# Patient Record
Sex: MALE | Race: OTHER | Employment: FULL TIME | URBAN - METROPOLITAN AREA
[De-identification: names, ages, dates, MRNs, and addresses within clinical notes are randomized per-mention and may not be internally consistent; named-entity substitution may affect disease eponyms.]

---

## 2022-03-09 ENCOUNTER — OFFICE VISIT (OUTPATIENT)
Dept: INTERNAL MEDICINE CLINIC | Age: 42
End: 2022-03-09
Payer: COMMERCIAL

## 2022-03-09 VITALS
DIASTOLIC BLOOD PRESSURE: 80 MMHG | OXYGEN SATURATION: 98 % | TEMPERATURE: 96 F | BODY MASS INDEX: 36.26 KG/M2 | HEART RATE: 56 BPM | RESPIRATION RATE: 16 BRPM | HEIGHT: 67 IN | SYSTOLIC BLOOD PRESSURE: 122 MMHG | WEIGHT: 231 LBS

## 2022-03-09 DIAGNOSIS — Z13.6 ENCOUNTER FOR LIPID SCREENING FOR CARDIOVASCULAR DISEASE: ICD-10-CM

## 2022-03-09 DIAGNOSIS — E66.9 CLASS 2 OBESITY WITHOUT SERIOUS COMORBIDITY WITH BODY MASS INDEX (BMI) OF 36.0 TO 36.9 IN ADULT, UNSPECIFIED OBESITY TYPE: Primary | ICD-10-CM

## 2022-03-09 DIAGNOSIS — Z76.89 ENCOUNTER TO ESTABLISH CARE WITH NEW DOCTOR: ICD-10-CM

## 2022-03-09 DIAGNOSIS — B35.4 TINEA CORPORIS: ICD-10-CM

## 2022-03-09 DIAGNOSIS — Z13.220 ENCOUNTER FOR LIPID SCREENING FOR CARDIOVASCULAR DISEASE: ICD-10-CM

## 2022-03-09 DIAGNOSIS — Z13.1 ENCOUNTER FOR SCREENING FOR DIABETES MELLITUS: ICD-10-CM

## 2022-03-09 LAB
ANION GAP SERPL CALC-SCNC: 4 MMOL/L (ref 5–15)
BUN SERPL-MCNC: 12 MG/DL (ref 6–20)
BUN/CREAT SERPL: 15 (ref 12–20)
CALCIUM SERPL-MCNC: 9.3 MG/DL (ref 8.5–10.1)
CHLORIDE SERPL-SCNC: 104 MMOL/L (ref 97–108)
CHOLEST SERPL-MCNC: 188 MG/DL
CO2 SERPL-SCNC: 27 MMOL/L (ref 21–32)
CREAT SERPL-MCNC: 0.82 MG/DL (ref 0.7–1.3)
EST. AVERAGE GLUCOSE BLD GHB EST-MCNC: 100 MG/DL
GLUCOSE SERPL-MCNC: 85 MG/DL (ref 65–100)
HBA1C MFR BLD: 5.1 % (ref 4–5.6)
HDLC SERPL-MCNC: 52 MG/DL
HDLC SERPL: 3.6 {RATIO} (ref 0–5)
HIV 1+2 AB+HIV1 P24 AG SERPL QL IA: NONREACTIVE
HIV12 RESULT COMMENT, HHIVC: NORMAL
LDLC SERPL CALC-MCNC: 110.6 MG/DL (ref 0–100)
POTASSIUM SERPL-SCNC: 4.8 MMOL/L (ref 3.5–5.1)
SODIUM SERPL-SCNC: 135 MMOL/L (ref 136–145)
TRIGL SERPL-MCNC: 127 MG/DL (ref ?–150)
VLDLC SERPL CALC-MCNC: 25.4 MG/DL

## 2022-03-09 PROCEDURE — 99203 OFFICE O/P NEW LOW 30 MIN: CPT | Performed by: INTERNAL MEDICINE

## 2022-03-09 RX ORDER — DOXYLAMINE SUCCINATE 25 MG
TABLET ORAL 2 TIMES DAILY
Qty: 15 G | Refills: 0 | Status: SHIPPED | OUTPATIENT
Start: 2022-03-09

## 2022-03-09 NOTE — PROGRESS NOTES
Office Visit Note:    Assessment/Plan:  1. Class 2 obesity without serious comorbidity with body mass index (BMI) of 36.0 to 36.9 in adult, unspecified obesity type    2. Encounter to establish care with new doctor    3. Encounter for screening for diabetes mellitus    4. Encounter for lipid screening for cardiovascular disease    5. Tinea corporis      1. Obesity -advised low-carb diet, regular exercise  2. Tinea corporis-we will try topical miconazole, if it does not improve I will refer him to dermatology. Health Maintenance:  Immunizations:  Tetanus: due 2028  Covid: 3 pfizer    Screening:  Hepatitis C: Ordered today  HIV: Ordered today  Diabetes: A1c ordered today  Depression: PHQ-2 negative  Exercise: fair  Diet: fair  Sleep: ok, takes melatonon  Blood pressure good today, advised to check blood pressure at home  Follow-up in 3 months    Orders Placed This Encounter    LIPID PANEL     Standing Status:   Future     Standing Expiration Date:   3/9/2023    HIV 1/2 AG/AB, 4TH GENERATION,W RFLX CONFIRM     Standing Status:   Future     Standing Expiration Date:   3/9/2023    HEMOGLOBIN A1C WITH EAG     Standing Status:   Future     Standing Expiration Date:   3/9/2023    HCV AB W/RFLX TO HANSEL     Standing Status:   Future     Standing Expiration Date:   5/0/9285    METABOLIC PANEL, BASIC     Standing Status:   Future     Standing Expiration Date:   3/9/2023    miconazole (MICOTIN) 2 % topical cream     Sig: Apply  to affected area two (2) times a day.      Dispense:  15 g     Refill:  0     Social Determinants of Health     Tobacco Use: Medium Risk    Smoking Tobacco Use: Former Smoker    Smokeless Tobacco Use: Never Used   Alcohol Use:     Frequency of Alcohol Consumption: Not on file    Average Number of Drinks: Not on file    Frequency of Binge Drinking: Not on file   Financial Resource Strain:     Difficulty of Paying Living Expenses: Not on file   Food Insecurity:     Worried About Running Out of Food in the Last Year: Not on file    Ran Out of Food in the Last Year: Not on file   Transportation Needs:     Lack of Transportation (Medical): Not on file    Lack of Transportation (Non-Medical): Not on file   Physical Activity:     Days of Exercise per Week: Not on file    Minutes of Exercise per Session: Not on file   Stress:     Feeling of Stress : Not on file   Social Connections:     Frequency of Communication with Friends and Family: Not on file    Frequency of Social Gatherings with Friends and Family: Not on file    Attends Orthodoxy Services: Not on file    Active Member of [address] [place] [address] [employer] or Organizations: Not on file    Attends Club or Organization Meetings: Not on file    Marital Status: Not on file   Intimate Partner Violence:     Fear of Current or Ex-Partner: Not on file    Emotionally Abused: Not on file    Physically Abused: Not on file    Sexually Abused: Not on file   Depression: Not at risk    PHQ-2 Score: 0   Housing Stability:     Unable to Pay for Housing in the Last Year: Not on file    Number of [place] in the Last Year: Not on file    Unstable Housing in the Last Year: Not on file       Follow-up and Dispositions    · Return in about 3 months (around 6/9/2022). I have reviewed with the patient details of the assessment and plan and all questions were answered. Relevant patient education was performed. The most recent lab findings were reviewed with the patient. An After Visit Summary was printed and given to the patient. Reason for Visit: Establish Care      Subjective:  37 y.o. male with no significant past medical history comes to establish with me as a primary care physician. He complains of pruritus in his testicles which has been going on for more than a year. Apparently his last doctor told him that he had jock itch and had advised him to take an over-the-counter medication which has not been helping him.   He denies any other complaints to me    Review of Systems  A complete 11 system ROS was preformed (constitutional, eyes, ENT, cardiovascular, respiratory, gastrointestinal, genitourinary, musculoskeletal, skin, neurological, psychiatric) and was negative aside from the pertinent positives and negatives noted in the HPI. Past Medical History:   Diagnosis Date    Hypertension      History reviewed. No pertinent surgical history. Social History     Socioeconomic History    Marital status:    Tobacco Use    Smoking status: Former Smoker    Smokeless tobacco: Never Used   Substance and Sexual Activity    Alcohol use: Yes     Comment: social    Drug use: Not Currently    Sexual activity: Yes     History reviewed. No pertinent family history. No Known Allergies    Objective:  Visit Vitals  /80 (BP 1 Location: Left upper arm, BP Patient Position: Sitting, BP Cuff Size: Adult)   Pulse (!) 56   Temp (!) 96 °F (35.6 °C) (Oral)   Resp 16   Ht 5' 7\" (1.702 m)   Wt 231 lb (104.8 kg)   SpO2 98%   BMI 36.18 kg/m²     Physical Exam:   AA&O x3. Not pale, not in any distress. HEENT: ENT negative. Neck: Supple, no JVD or lymphadenopathy. Lungs: clear  Heart: S1 S2 +, RRR  Abdomen: Soft, No tenderness  Neuro: No focal deficits. Skin: No erythema or lesions noted. Extremities: no pedal edema, good peripheral pulses  Psych: Normal affect and mood. No results found for this or any previous visit. Janel Jones MD, [facility].   [address]

## 2022-03-09 NOTE — PATIENT INSTRUCTIONS
Dustin 62  This diet is found in the Appendix of the book Why We Get Fat by Mariel Simons and is an example of a low carbohydrate diet. \"No Sugar, No Starch\" Diet: Getting Started  This diet is focused on providing your body with the nutrition it needs, while eliminating foods that your body does not require, namely, nutritionally empty carbohydrates. For most effective weight loss, you will need to keep the total number of carbohydrate grams to fewer than 20 grams per day. Your diet is to be made up exclusively of foods and beverages from this handout. If the food is packaged, check the label and make sure that the carbohydrate count is 1 to 2 grams or less for meat and dairy products, 5 grams or less for vegetables. All food may be cooked in a microwave oven, baked, boiled, stir-?fried, sautéed, roasted, fried (with no flour, breading, or cornmeal), or grilled. WHEN YOU ARE HUNGRY,  EAT YOUR CHOICE OF THE FOLLOWING FOODS:  Meat: Beef (including hamburger and steak), pork, ham (unglazed), jules, lamb, veal, or other meats. For processed meats (sausage, pepperoni, hot dogs), check the label carbohydrate count should be about 1 gram per serving (and be organic if able and nitrate free). Poultry: Chicken, turkey, duck, or other fowl. Fish and Shellfish: Any fish, including tuna, salmon, catfish, bass, trout, shrimp, scallops, crab, and lobster (no farmed seafood, there are too many toxins in them). Eggs: Whole eggs are permitted without restrictions. You do not have to avoid the fat that comes with the above foods. You do not have to limit quantities deliberately, but you should stop eating when you feel full. FOODS THAT MUST BE EATEN EVERY DAY:  Salad Greens: 2 cups a day.  Includes arugula, bok brandon, cabbage (all varieties), chard, chives, endive, greens (all varieties, including beet, collards, mustard, and turnip), kale, lettuce (all varieties), parsley, spinach, radicchio, radishes, scallions, and watercress. (If it is a leaf, you may eat it.)    Vegetables: 1 cup (measured uncooked) a day. Includes artichokes, asparagus, broccoli, [place] sprouts, cauliflower, celery, cucumber, eggplant, green beans (string beans), jicama, leeks, mushrooms, okra, onions, pepper pumpkin, shallots, snow peas, sprouts (bean and alfalfa) sugar snap peas, summer squash, tomatoes, rhubarb, wax beans, zucchini. Bouillon: 2 cups dailyas needed for sodium replenishment. Clear broth (consommé) is strongly recommended, unless you are on a sodium-?restricted diet for hypertension or heart failure. FOODS ALLOWED IN LIMITED QUANTITIES  Cheese: up to 4 ounces a day. Includes hard, aged cheeses such as Swiss and Cheddar, as well as Brie, Camembert blue, mozzarella, Gruyere, cream cheese, goat cheeses. Avoid processed cheeses, such as Velveeta. Check the label; carbohydrate count should be less than 1 gram per serving. Cream: up to 4 tablespoonfuls a day. Includes heavy, light, or sour cream (not half and half). Mayonnaise: up to 4 tablespoons a day. Kong's and Hellmann's are low-?carb. Check the labels of other brands. Olives (Black or Green): up to 6 a day. Avocado: up to 1/2 of a fruit a day. Lemon/Lime Juice: up to 4 teaspoonfuls a day. Soy Sauces: up to 4 tablespoons a day. Reesa Rape is a low carb brand. Check the labels of other brands. Pat Postin or Sugar-? Free: up to 2 a servings a day. Abbe Martinez makes sugar-?free pickles. Check the labels for carbohydrates and serving size. Snacks: Pork rinds/skins; pepperoni slices; ham, beef, turkey, and other meat roll-?ups; deviled eggs. THE PRIMARY RESTRICTION: CARBOHYDRATES  On this diet, no sugars (simple carbohydrates) and no starches (complex carbohydrates) are eaten. The only carbohydrates encouraged are the nutritionally dense, fiber-?rich vegetables listed. Sugars are simple carbohydrates.  Avoid these kinds of foods: white sugar, brown sugar, honey, maple syrup, molasses, corn syrup, beer (contains barley malt), milk (contains lactose), flavored yogurts, fruit juice, and fruit. Starches are complex carbohydrates. Avoid these kinds of foods: grains (even \"whole\" grains), rice, cereals, flour, cornstarch, breads, pastas, muffins, bagels, crackers, and \"starchy\" vegetables such as slow-?cooked beans (santiago, lima, black beans), carrots, parsnips, corn, peas, potatoes, French fries, potato chips. FATS AND OILS  All fats and oils, even butter, are allowed. Olive oil and peanut oil are especially healthy oils and are encouraged in cooking. Avoid margarine and other hydrogenated oils that contain trans fats. For salad dressings, the ideal dressing is a homemade oil-?and-?vinegar dressing, with lemon juice and spices as needed. Blue-?cheese, ranch, [place], and [place] are also acceptable if the label says 1 to 2 grams of carbohydrate per serving or less. Avoid lite dressings, because these commonly have more carbohydrate. Chopped eggs, jules, and/or grated cheese may also be included in salads. Fats, in general, are important to include, because they taste good and make you feel full. You are therefore permitted the fat or skin that is served with the meat or poultry that you eat, as long as there is no breading on the skin. Do not attempt to follow a low-?fat diet! SWEETENERS AND DESSERTS  If you feel the need to eat or drink something sweet, you should select the most sensible alternative sweetener(s) available. Available alternative sweeteners are: Splenda (sucralose), Nutra-?sweet (aspartame), Truvia (stevia/erythritol blend), and Sweet 'N Low (saccharin). Avoid food with sugar alcohols (such as sorbitol and maltitol) for now, because they occasionally cause stomach upset, although they may be permitted in limited quantities in the future.  (Would recommend you stay away from all artificial sweeteners if able or use Dr. Jose R Alvarez)    BEVERAGES  Drink as much as you would like of the allowed beverages, do not force fluids beyond your capacity. The best beverage is water. Essence-?flavored seltzers (zero carbs) and bottled spring and mineral kennedy are also good choices. Caffeinated beverages: Some patients find that their caffeine intake interferes with their weight loss and blood sugar control. With this in mind, you may have up to 3 cups of coffee (black, or with artificial sweetener and/or cream), tea (unsweetened or artificially sweetened), or caffeinated diet soda per day. ALCOHOL  At first, avoid alcohol consumption on this diet. At a later point in time, as weight loss and dietary patterns become well established, alcohol in moderate quantities, if low in carbohydrates, may be added back into the diet. QUANTITIES  Eat when you are hungry; stop when you are full. The diet works best on a [employer] is, eat whenever you are hungry; try not to eat more than what will satisfy you. Learn to listen to your body. A low-?carbohydrate diet has a natural appetite-?reduction effect to ease you into the consumption of smaller and smaller quantities comfortably. Therefore, do not eat everything on your plate just because it's there. On the other hand, don't go hungry! You are not counting calories. Enjoy losing weight comfortably, without hunger or cravings. It is recommended that you start your day with a nutritious low-?carbohydrate meal. Note that many medications and nutritional supplements need to be taken with food at each meal, or three times per day. IMPORTANT TIPS AND REMINDERS  The following items are NOT on the diet: sugar, bread, cereal, flour-?containing items, fruits, juices, honey, whole or skimmed water, milk, yogurt, canned soups, dairy substitutes, ketchup, sweet condiments and relishes.     Avoid these common mistakes: Beware of \"fat-?free\" or \"lite\" diet products, and foods containing \"hidden\" sugars and starches (such as coleslaw or sugar-?free cookies and cakes). Check the labels of liquid medications, cough syrups, cough drops, and or other over-?the-?counter medications that may contain sugar. Avoid products that are labeled \"Great for Low-?Carb Diets! \"    LOW-?CARB MENU PLANNING    What does a low-?carbohydrate menu look like? You can plan your daily menu by using the following as a guide:    Breakfast  Meat or other protein source (usually eggs)  Fat source This may already be in your protein; for example, jules and eggs have fat in them. But if your protein source is \"lean,\" add some fat in the form of butter, cream (in coffee) or cheese. Low-?carbohydrate vegetable (if desired)This can be in omelet or a breakfast quiche. Lunch  Meat or other protein source  Fat source -? If your protein is \"lean,\" add some fat, in the form of butter, salad dressing, cheese, cream, or avocado. 1 to 1 ½ cups of salad greens or cooked greens  ½ to 1 cup of vegetables    Snack  Low-?carbohydrate snack that has protein and/or fat. Dinner    Meat or other protein source  Fat sourceIf your protein is \"lean,\" add some fat in the butter, salad dressing, cheese, cream, or avocado. 1 to 1½ cups of salad greens or cooked greens  ½ to 1 cup of vegetables    A sample day may look like this:    Breakfast  Jules or sausage  Eggs    Lunch  Grilled chicken on top of salad greens and other vegetables, with jules, chopped eggs, and salad dressing    Snack  Pepperoni slices and a cheese stick    Dinner  Burger andrea or steak  Green salad with other acceptable vegetables and salad dressing  Green beans with butter    READING A LOW-?CARB LABEL  Start by checking the nutrition facts. ? Look at serving size, total carbohydrate, and fiber. ? Use total carbohydrate content only. ? You may subtract fiber from total carbohydrate to get the \"effective or net carb count. \" For example, if there are 7 grams of carbohydrate and 3 grams of fiber, the difference    yields 4 grams of effective carbohydrates. That means the effective carbohydrate count is 4 grams per serving. ? No need to worryat this pointabout calories or fat. ? Effective carbohydrate count of vegetables should be 5 grams or less. ? Effective carbohydrate count of meat or condiments should be 1 gram or less. ? Also check the ingredient list. Avoid foods that have any form of sugar or starch listed in the first 5 ingredients. Sugar by any other name is still sugar! All of these are forms of sugar: sucrose, dextrose, fructose, maltose, lactose, glucose, honey, agave syrup, high-?fructose corn syrup, maple syrup, brown-?rice syrup, molasses, evaporated cane juice, cane juice, fruit-? juice concentrate, corn sweetener.

## 2022-03-09 NOTE — PROGRESS NOTES
Chief Complaint   Patient presents with   [facility] Establish Care     1. Have you been to the ER, urgent care clinic since your last visit? Hospitalized since your last visit? No    2. Have you seen or consulted any other health care providers outside of the [address] [place] [address] since your last visit? Include any pap smears or colon screening.  No

## 2022-03-10 LAB
HCV AB S/CO SERPL IA: <0.1 S/CO RATIO (ref 0–0.9)
HCV AB SERPL QL IA: NORMAL

## 2024-01-09 ENCOUNTER — OFFICE VISIT (OUTPATIENT)
Facility: CLINIC | Age: 44
End: 2024-01-09
Payer: COMMERCIAL

## 2024-01-09 VITALS
BODY MASS INDEX: 35.31 KG/M2 | HEIGHT: 67 IN | TEMPERATURE: 98.3 F | SYSTOLIC BLOOD PRESSURE: 141 MMHG | OXYGEN SATURATION: 96 % | WEIGHT: 225 LBS | HEART RATE: 63 BPM | RESPIRATION RATE: 16 BRPM | DIASTOLIC BLOOD PRESSURE: 90 MMHG

## 2024-01-09 DIAGNOSIS — F51.01 PRIMARY INSOMNIA: ICD-10-CM

## 2024-01-09 DIAGNOSIS — L30.9 ECZEMA, UNSPECIFIED TYPE: ICD-10-CM

## 2024-01-09 DIAGNOSIS — Z00.00 VISIT FOR WELL MAN HEALTH CHECK: Primary | ICD-10-CM

## 2024-01-09 DIAGNOSIS — R03.0 ELEVATED BLOOD PRESSURE READING: ICD-10-CM

## 2024-01-09 PROCEDURE — 99386 PREV VISIT NEW AGE 40-64: CPT | Performed by: FAMILY MEDICINE

## 2024-01-09 PROCEDURE — 99203 OFFICE O/P NEW LOW 30 MIN: CPT | Performed by: FAMILY MEDICINE

## 2024-01-09 RX ORDER — TRAZODONE HYDROCHLORIDE 50 MG/1
50 TABLET ORAL NIGHTLY PRN
Qty: 30 TABLET | Refills: 0 | Status: SHIPPED | OUTPATIENT
Start: 2024-01-09

## 2024-01-09 RX ORDER — CLOBETASOL PROPIONATE 0.5 MG/G
OINTMENT TOPICAL 2 TIMES DAILY
Qty: 30 G | Refills: 0 | Status: SHIPPED | OUTPATIENT
Start: 2024-01-09

## 2024-01-09 SDOH — ECONOMIC STABILITY: HOUSING INSECURITY
IN THE LAST 12 MONTHS, WAS THERE A TIME WHEN YOU DID NOT HAVE A STEADY PLACE TO SLEEP OR SLEPT IN A SHELTER (INCLUDING NOW)?: NO

## 2024-01-09 SDOH — ECONOMIC STABILITY: INCOME INSECURITY: HOW HARD IS IT FOR YOU TO PAY FOR THE VERY BASICS LIKE FOOD, HOUSING, MEDICAL CARE, AND HEATING?: NOT VERY HARD

## 2024-01-09 SDOH — ECONOMIC STABILITY: FOOD INSECURITY: WITHIN THE PAST 12 MONTHS, YOU WORRIED THAT YOUR FOOD WOULD RUN OUT BEFORE YOU GOT MONEY TO BUY MORE.: NEVER TRUE

## 2024-01-09 SDOH — ECONOMIC STABILITY: FOOD INSECURITY: WITHIN THE PAST 12 MONTHS, THE FOOD YOU BOUGHT JUST DIDN'T LAST AND YOU DIDN'T HAVE MONEY TO GET MORE.: NEVER TRUE

## 2024-01-09 ASSESSMENT — PATIENT HEALTH QUESTIONNAIRE - PHQ9
SUM OF ALL RESPONSES TO PHQ QUESTIONS 1-9: 0
SUM OF ALL RESPONSES TO PHQ QUESTIONS 1-9: 0
1. LITTLE INTEREST OR PLEASURE IN DOING THINGS: 0
SUM OF ALL RESPONSES TO PHQ QUESTIONS 1-9: 0
SUM OF ALL RESPONSES TO PHQ9 QUESTIONS 1 & 2: 0
SUM OF ALL RESPONSES TO PHQ QUESTIONS 1-9: 0
2. FEELING DOWN, DEPRESSED OR HOPELESS: 0

## 2024-01-09 NOTE — PROGRESS NOTES
were reviewed and or requested, and  Patient Past Records were reviewed and or requested  Yes     Chief Complaint   Patient presents with    Annual Exam     Patient is here for a wellness visit      he is a 43 y.o. year old male who presents for follow-up of insomnia.  Patient states that he has had insomnia for the last couple years and has been taking over-the-counter Dramamine to help him sleep at night.  Denies any previous treatment with prescription medication.  States that he would like to try a medication on a nonhabit-forming basis.  Patient also states that he is having irritation of his finger after getting gorilla glue on his finger about 2 years ago.  States that it will occasionally open up and crack with drying of skin.  Patient states that he is trying to get this followed off the past.    Reviewed and agree with Nurse Note and duplicated in this note.  Reviewed PmHx, RxHx, FmHx, SocHx, AllgHx and updated and dated in the chart.    No family history on file.    Past Medical History:   Diagnosis Date    Hypertension       Social History     Socioeconomic History    Marital status:    Tobacco Use    Smoking status: Former    Smokeless tobacco: Never   Substance and Sexual Activity    Alcohol use: Yes    Drug use: Not Currently     Social Determinants of Health     Financial Resource Strain: Low Risk  (1/9/2024)    Overall Financial Resource Strain (CARDIA)     Difficulty of Paying Living Expenses: Not very hard   Food Insecurity: No Food Insecurity (1/9/2024)    Hunger Vital Sign     Worried About Running Out of Food in the Last Year: Never true     Ran Out of Food in the Last Year: Never true   Transportation Needs: Unknown (1/9/2024)    PRAPARE - Transportation     Lack of Transportation (Non-Medical): No   Housing Stability: Unknown (1/9/2024)    Housing Stability Vital Sign     Unstable Housing in the Last Year: No        Review of Systems - negative except as listed above      Objective:

## 2024-01-09 NOTE — PATIENT INSTRUCTIONS

## 2024-01-10 DIAGNOSIS — R73.9 ELEVATED BLOOD SUGAR: Primary | ICD-10-CM

## 2024-01-10 LAB
ALBUMIN SERPL-MCNC: 3.9 G/DL (ref 3.5–5)
ALBUMIN/GLOB SERPL: 1.1 (ref 1.1–2.2)
ALP SERPL-CCNC: 84 U/L (ref 45–117)
ALT SERPL-CCNC: 41 U/L (ref 12–78)
ANION GAP SERPL CALC-SCNC: 8 MMOL/L (ref 5–15)
AST SERPL-CCNC: 17 U/L (ref 15–37)
BASOPHILS # BLD: 0 K/UL (ref 0–0.1)
BASOPHILS NFR BLD: 1 % (ref 0–1)
BILIRUB SERPL-MCNC: 0.3 MG/DL (ref 0.2–1)
BUN SERPL-MCNC: 16 MG/DL (ref 6–20)
BUN/CREAT SERPL: 21 (ref 12–20)
CALCIUM SERPL-MCNC: 9 MG/DL (ref 8.5–10.1)
CHLORIDE SERPL-SCNC: 105 MMOL/L (ref 97–108)
CHOLEST SERPL-MCNC: 185 MG/DL
CO2 SERPL-SCNC: 25 MMOL/L (ref 21–32)
CREAT SERPL-MCNC: 0.78 MG/DL (ref 0.7–1.3)
DIFFERENTIAL METHOD BLD: ABNORMAL
EOSINOPHIL # BLD: 0.1 K/UL (ref 0–0.4)
EOSINOPHIL NFR BLD: 1 % (ref 0–7)
ERYTHROCYTE [DISTWIDTH] IN BLOOD BY AUTOMATED COUNT: 12.4 % (ref 11.5–14.5)
GLOBULIN SER CALC-MCNC: 3.7 G/DL (ref 2–4)
GLUCOSE SERPL-MCNC: 108 MG/DL (ref 65–100)
HCT VFR BLD AUTO: 45.7 % (ref 36.6–50.3)
HDLC SERPL-MCNC: 46 MG/DL
HDLC SERPL: 4 (ref 0–5)
HGB BLD-MCNC: 16.1 G/DL (ref 12.1–17)
IMM GRANULOCYTES # BLD AUTO: 0 K/UL (ref 0–0.04)
IMM GRANULOCYTES NFR BLD AUTO: 1 % (ref 0–0.5)
LDLC SERPL CALC-MCNC: 95 MG/DL (ref 0–100)
LYMPHOCYTES # BLD: 1.6 K/UL (ref 0.8–3.5)
LYMPHOCYTES NFR BLD: 24 % (ref 12–49)
MCH RBC QN AUTO: 31.4 PG (ref 26–34)
MCHC RBC AUTO-ENTMCNC: 35.2 G/DL (ref 30–36.5)
MCV RBC AUTO: 89.1 FL (ref 80–99)
MONOCYTES # BLD: 0.6 K/UL (ref 0–1)
MONOCYTES NFR BLD: 8 % (ref 5–13)
NEUTS SEG # BLD: 4.4 K/UL (ref 1.8–8)
NEUTS SEG NFR BLD: 65 % (ref 32–75)
NRBC # BLD: 0 K/UL (ref 0–0.01)
NRBC BLD-RTO: 0 PER 100 WBC
PLATELET # BLD AUTO: 288 K/UL (ref 150–400)
PMV BLD AUTO: 11.3 FL (ref 8.9–12.9)
POTASSIUM SERPL-SCNC: 4.1 MMOL/L (ref 3.5–5.1)
PROT SERPL-MCNC: 7.6 G/DL (ref 6.4–8.2)
RBC # BLD AUTO: 5.13 M/UL (ref 4.1–5.7)
SODIUM SERPL-SCNC: 138 MMOL/L (ref 136–145)
TRIGL SERPL-MCNC: 220 MG/DL
VLDLC SERPL CALC-MCNC: 44 MG/DL
WBC # BLD AUTO: 6.7 K/UL (ref 4.1–11.1)